# Patient Record
Sex: FEMALE | Race: BLACK OR AFRICAN AMERICAN | NOT HISPANIC OR LATINO | URBAN - METROPOLITAN AREA
[De-identification: names, ages, dates, MRNs, and addresses within clinical notes are randomized per-mention and may not be internally consistent; named-entity substitution may affect disease eponyms.]

---

## 2022-01-31 ENCOUNTER — EMERGENCY (EMERGENCY)
Facility: HOSPITAL | Age: 37
LOS: 1 days | Discharge: HOME | End: 2022-01-31
Attending: EMERGENCY MEDICINE | Admitting: EMERGENCY MEDICINE
Payer: MEDICAID

## 2022-01-31 VITALS
RESPIRATION RATE: 17 BRPM | OXYGEN SATURATION: 99 % | SYSTOLIC BLOOD PRESSURE: 141 MMHG | DIASTOLIC BLOOD PRESSURE: 97 MMHG | TEMPERATURE: 98 F | HEART RATE: 74 BPM

## 2022-01-31 VITALS
OXYGEN SATURATION: 100 % | RESPIRATION RATE: 18 BRPM | TEMPERATURE: 98 F | SYSTOLIC BLOOD PRESSURE: 137 MMHG | DIASTOLIC BLOOD PRESSURE: 81 MMHG | HEART RATE: 63 BPM

## 2022-01-31 PROBLEM — Z00.00 ENCOUNTER FOR PREVENTIVE HEALTH EXAMINATION: Status: ACTIVE | Noted: 2022-01-31

## 2022-01-31 PROCEDURE — 99283 EMERGENCY DEPT VISIT LOW MDM: CPT | Mod: 25

## 2022-01-31 PROCEDURE — 99282 EMERGENCY DEPT VISIT SF MDM: CPT

## 2022-01-31 NOTE — ED PROVIDER NOTE - CLINICAL SUMMARY MEDICAL DECISION MAKING FREE TEXT BOX
O'Cynthia DO PGY-2: pt p/w b/l ear canal cerumen impaction. NO PAIN only decrease in hearing. No red flags of mastoid tenderness.

## 2022-01-31 NOTE — ED PROVIDER NOTE - CARE PROVIDER_API CALL
Gracie Steele)  Otolaryngology  [address] [place] [address], [address]  [place] [address]  Phone: [phone]  Fax: [phone]  Follow Up Time: 1-3 Days

## 2022-01-31 NOTE — ED PROVIDER NOTE - PATIENT PORTAL LINK FT
You can access the FollowMyHealth Patient Portal offered by [facility] by registering at the following website: [email or web address]. By joining [facility]’s FollowMyHealth portal, you will also be able to view your health information using other applications (apps) compatible with our system.

## 2022-01-31 NOTE — ED PROVIDER NOTE - OBJECTIVE STATEMENT
37 y/o F p/w b/l ear cerumen impaction. Pt denies ANY pain. Pt went to PCP earlier today who saw the impaction then gave the pt info to see an ENT doc. Pt did not want to wait and came to the ED. Pt denies hx of recurrent ear infxn. Pt denies any ear pain. Pt only c/o decrease in hearing in b/l ears.

## 2022-01-31 NOTE — ED PROVIDER NOTE - PHYSICAL EXAMINATION
Stable CONSTITUTIONAL: Well-developed; well-nourished; in no acute distress.   SKIN: warm, dry  HEAD: Normocephalic; atraumatic.  EYES: no conjunctival injection. PERRL.   ENT: No nasal discharge; airway clear. +b/l cerumen impaction. Unable to visualise the TM's. No mastoid tenderness   NECK: Supple; non tender.  CARD: S1, S2 normal; no murmurs, gallops, or rubs. Regular rate and rhythm.   RESP: No wheezes, rales or rhonchi. Good air movement bilaterally.   ABD: soft ntnd, no guarding, no distention, no rigidity.   EXT: Ambulates independently.  No cyanosis or edema.   NEURO: Alert, oriented, grossly unremarkable  PSYCH: Cooperative, appropriate.

## 2022-01-31 NOTE — ED ADULT NURSE NOTE - OBJECTIVE STATEMENT
36 y F presents to the ED with b/l ear pain since 1/26. Pt went to see pcp who told her she had cecum impaction. Pt denies pain. reports that she has decreased hearing on the L. On assessment, A&Ox4. Breathing spontaneously and unlabored on room air. Pt safety maintained. Call bell within reach. Side rails in upward position. Seen and evaluated by MD. Awaiting dispo.

## 2022-01-31 NOTE — ED PROVIDER NOTE - NSFOLLOWUPCLINICS_GEN_ALL_ED_FT
[facility] - ENT  Otolaryngology (ENT)  [address] [place] [address]  Phone: [phone]  Fax:   Follow Up Time: 1-3 Days    [facility] Internal Medicine  General Internal Medicine  [address] [place] [id number]  Phone: [phone]  Fax:   Follow Up Time: 1-3 Days

## 2022-01-31 NOTE — ED PROVIDER NOTE - NSFOLLOWUPINSTRUCTIONS_ED_ALL_ED_FT
(1) Listed below are the specialists that will be necessary to see as an outpatient to continue the workup.  Please call the numbers listed below or [phone] to set up the necessary appointments.  (2) Immediately seek care at your nearest emergency room if your symptoms worsen, persist, or do not resolve   (3) Continue to use Over-The-Counter Debrox    ---------------------------------------------------------------------    Earwax Buildup, Adult      The ears produce a substance called earwax that helps keep bacteria out of the ear and protects the skin in the ear canal. Occasionally, earwax can build up in the ear and cause discomfort or hearing loss.      What are the causes?    This condition is caused by a buildup of earwax. Ear canals are self-cleaning. Ear wax is made in the outer part of the ear canal and generally falls out in small amounts over time.    When the self-cleaning mechanism is not working, earwax builds up and can cause decreased hearing and discomfort. Attempting to clean ears with cotton swabs can push the earwax deep into the ear canal and cause decreased hearing and pain.    What increases the risk?    This condition is more likely to develop in people who:  •Clean their ears often with cotton swabs.  •Pick at their ears.  •Use earplugs or in-ear headphones often, or wear hearing aids.      The following factors may also make you more likely to develop this condition:  •Being male.  •Being of older age.  •Naturally producing more earwax.  •Having narrow ear canals.  •Having earwax that is overly thick or sticky.  •Having excess hair in the ear canal.  •Having eczema.  •Being dehydrated.    What are the signs or symptoms?    Symptoms of this condition include:  •Reduced or muffled hearing.  •A feeling of fullness in the ear or feeling that the ear is plugged.  •Fluid coming from the ear.  •Ear pain or an itchy ear.  •Ringing in the ear.  •Coughing.  •Balance problems.  •An obvious piece of earwax that can be seen inside the ear canal.    How is this diagnosed?    This condition may be diagnosed based on:  •Your symptoms.  •Your medical history.  •An ear exam. During the exam, your health care provider will look into your ear with an instrument called an otoscope.    You may have tests, including a hearing test.    How is this treated?    This condition may be treated by:  •Using ear drops to soften the earwax.  •Having the earwax removed by a health care provider. The health care provider may:  •Flush the ear with water.  •Use an instrument that has a loop on the end (curette).  •Having surgery to remove the wax buildup. This may be done in severe cases.    Contact a health care provider if:  •You have ear pain.  •You develop a fever.  •You have pus or other fluid coming from your ear.  •You have hearing loss.  •You have ringing in your ears that does not go away.  •You feel like the room is spinning (vertigo).  •Your symptoms do not improve with treatment.    Get help right away if:  •You have bleeding from the affected ear.  •You have severe ear pain.    Summary  •Earwax can build up in the ear and cause discomfort or hearing loss.  •The most common symptoms of this condition include reduced or muffled hearing, a feeling of fullness in the ear, or feeling that the ear is plugged.  •This condition may be diagnosed based on your symptoms, your medical history, and an ear exam.  •This condition may be treated by using ear drops to soften the earwax or by having the earwax removed by a health care provider.  • Do not put any objects, including cotton swabs, into your ear. You can clean the opening of your ear canal with a washcloth or facial tissue. While you were here, your blood pressure was elevated.  Please follow up with your primary care doctor for follow up in regards to this matter in 1-3 days.    (1) Listed below are the specialists that will be necessary to see as an outpatient to continue the workup.  Please call the numbers listed below or [phone] to set up the necessary appointments.  (2) Immediately seek care at your nearest emergency room if your symptoms worsen, persist, or do not resolve   (3) Continue to use Over-The-Counter Debrox    ---------------------------------------------------------------------    Earwax Buildup, Adult      The ears produce a substance called earwax that helps keep bacteria out of the ear and protects the skin in the ear canal. Occasionally, earwax can build up in the ear and cause discomfort or hearing loss.      What are the causes?    This condition is caused by a buildup of earwax. Ear canals are self-cleaning. Ear wax is made in the outer part of the ear canal and generally falls out in small amounts over time.    When the self-cleaning mechanism is not working, earwax builds up and can cause decreased hearing and discomfort. Attempting to clean ears with cotton swabs can push the earwax deep into the ear canal and cause decreased hearing and pain.    What increases the risk?    This condition is more likely to develop in people who:  •Clean their ears often with cotton swabs.  •Pick at their ears.  •Use earplugs or in-ear headphones often, or wear hearing aids.      The following factors may also make you more likely to develop this condition:  •Being male.  •Being of older age.  •Naturally producing more earwax.  •Having narrow ear canals.  •Having earwax that is overly thick or sticky.  •Having excess hair in the ear canal.  •Having eczema.  •Being dehydrated.    What are the signs or symptoms?    Symptoms of this condition include:  •Reduced or muffled hearing.  •A feeling of fullness in the ear or feeling that the ear is plugged.  •Fluid coming from the ear.  •Ear pain or an itchy ear.  •Ringing in the ear.  •Coughing.  •Balance problems.  •An obvious piece of earwax that can be seen inside the ear canal.    How is this diagnosed?    This condition may be diagnosed based on:  •Your symptoms.  •Your medical history.  •An ear exam. During the exam, your health care provider will look into your ear with an instrument called an otoscope.    You may have tests, including a hearing test.    How is this treated?    This condition may be treated by:  •Using ear drops to soften the earwax.  •Having the earwax removed by a health care provider. The health care provider may:  •Flush the ear with water.  •Use an instrument that has a loop on the end (curette).  •Having surgery to remove the wax buildup. This may be done in severe cases.    Contact a health care provider if:  •You have ear pain.  •You develop a fever.  •You have pus or other fluid coming from your ear.  •You have hearing loss.  •You have ringing in your ears that does not go away.  •You feel like the room is spinning (vertigo).  •Your symptoms do not improve with treatment.    Get help right away if:  •You have bleeding from the affected ear.  •You have severe ear pain.    Summary  •Earwax can build up in the ear and cause discomfort or hearing loss.  •The most common symptoms of this condition include reduced or muffled hearing, a feeling of fullness in the ear, or feeling that the ear is plugged.  •This condition may be diagnosed based on your symptoms, your medical history, and an ear exam.  •This condition may be treated by using ear drops to soften the earwax or by having the earwax removed by a health care provider.  • Do not put any objects, including cotton swabs, into your ear. You can clean the opening of your ear canal with a washcloth or facial tissue. While you were here, your blood pressure was elevated.  Please follow up with your primary care doctor for follow up in regards to this matter in 1-3 days.    (1) Listed below are the specialists that will be necessary to see as an outpatient to continue the workup.  Please call the numbers listed below or [phone] to set up the necessary appointments.  (2) Immediately seek care at your nearest emergency room if your symptoms worsen, persist, or do not resolve   (3) Continue to use Over-The-Counter Debrox    ---------------------------------------------------------------------    Earwax Blockage    AMBULATORY CARE:    Earwax can build up in your ear canal and cause a blockage. Earwax blockage happens when your ear makes earwax faster than your body can remove it.     Common symptoms include the following:   •Trouble hearing  •Earache  •Ear fullness or a feeling that something is plugging up your ear  •Itching or ringing in your ear  •Dizziness    Seed immediate care if:   •You feel dizzy  •You have discharge or blood coming out of your ear.  •Your ear pain does not go away or gets worse.    Call your doctor if:   •You have a fever.   •You have trouble hearing or hear ringing noises.  •You have questions or concerns about your condition or care.    Treatment for earwax blockage:   •Medicines placed in the ear canal can soften the earwax so it will come out.  •Flushing your ear canal with warm water may flush out the earwax.  •Small medical tools may be used to remove the earwax.    ---------------------------    Earwax Buildup, Adult      The ears produce a substance called earwax that helps keep bacteria out of the ear and protects the skin in the ear canal. Occasionally, earwax can build up in the ear and cause discomfort or hearing loss.      What are the causes?    This condition is caused by a buildup of earwax. Ear canals are self-cleaning. Ear wax is made in the outer part of the ear canal and generally falls out in small amounts over time.    When the self-cleaning mechanism is not working, earwax builds up and can cause decreased hearing and discomfort. Attempting to clean ears with cotton swabs can push the earwax deep into the ear canal and cause decreased hearing and pain.    What increases the risk?    This condition is more likely to develop in people who:  •Clean their ears often with cotton swabs.  •Pick at their ears.  •Use earplugs or in-ear headphones often, or wear hearing aids.      The following factors may also make you more likely to develop this condition:  •Being male.  •Being of older age.  •Naturally producing more earwax.  •Having narrow ear canals.  •Having earwax that is overly thick or sticky.  •Having excess hair in the ear canal.  •Having eczema.  •Being dehydrated.    What are the signs or symptoms?    Symptoms of this condition include:  •Reduced or muffled hearing.  •A feeling of fullness in the ear or feeling that the ear is plugged.  •Fluid coming from the ear.  •Ear pain or an itchy ear.  •Ringing in the ear.  •Coughing.  •Balance problems.  •An obvious piece of earwax that can be seen inside the ear canal.    How is this diagnosed?    This condition may be diagnosed based on:  •Your symptoms.  •Your medical history.  •An ear exam. During the exam, your health care provider will look into your ear with an instrument called an otoscope.    You may have tests, including a hearing test.    How is this treated?    This condition may be treated by:  •Using ear drops to soften the earwax.  •Having the earwax removed by a health care provider. The health care provider may:  •Flush the ear with water.  •Use an instrument that has a loop on the end (curette).  •Having surgery to remove the wax buildup. This may be done in severe cases.    Contact a health care provider if:  •You have ear pain.  •You develop a fever.  •You have pus or other fluid coming from your ear.  •You have hearing loss.  •You have ringing in your ears that does not go away.  •You feel like the room is spinning (vertigo).  •Your symptoms do not improve with treatment.    Get help right away if:  •You have bleeding from the affected ear.  •You have severe ear pain.    Summary  •Earwax can build up in the ear and cause discomfort or hearing loss.  •The most common symptoms of this condition include reduced or muffled hearing, a feeling of fullness in the ear, or feeling that the ear is plugged.  •This condition may be diagnosed based on your symptoms, your medical history, and an ear exam.  •This condition may be treated by using ear drops to soften the earwax or by having the earwax removed by a health care provider.  • Do not put any objects, including cotton swabs, into your ear. You can clean the opening of your ear canal with a washcloth or facial tissue.

## 2022-01-31 NOTE — ED ADULT NURSE NOTE - CAS EDN DISCHARGE INTERVENTIONS
Otolaryngologist Procedure Text (A): After obtaining clear surgical margins the patient was sent to otolaryngology for surgical repair.  The patient understands they will receive post-surgical care and follow-up from the referring physician's office. n/a

## 2022-01-31 NOTE — ED PROVIDER NOTE - NS ED ROS FT
CONSTITUTIONAL - No fever, No diaphoresis, No weight change  SKIN - No rash  HEMATOLOGIC - No abnormal bleeding or bruising  EYES +decreased hearing in b/l ears   ENT - No change in hearing, No sore throat, No neck pain, No rhinorrhea, No ear pain  RESPIRATORY - No shortness of breath, No cough  CARDIAC -No chest pain, No palpitations  GI - No abdominal pain, No nausea, No vomiting, No diarrhea, No constipation  - No dysuria, no frequency, no hematuria.   MUSCULOSKELETAL - No joint pain, No swelling, No back pain  NEUROLOGIC - No numbness, No focal weakness, No headache, No dizziness

## 2022-01-31 NOTE — ED PROVIDER NOTE - ATTENDING CONTRIBUTION TO CARE
Attending MD Toro: I personally have seen and examined this patient.  Resident note reviewed and agree on plan of care and except where noted.  See below for details.     seen in Blue 35R    36R with no reported PMH/PSH/Meds, no known drug allergies presents to the ED with bilateral ear cerumen impaction.  Reports that she has been having bilateral cerumen impaction.  Reports that she has been trying to use OTC wax dissolver but today went to see PMD who saw cerumen impaction and sent patient to ENT.  Reports that she has tried multiple ENTs but no one accepts her insurance.  Reports decreased hearing bilateral ears.  Denies pain at this time.  Denies change in vision, double vision, sudden loss of vision. Denies neck pain, headache.  Denies fevers, chills.      Exam:   General: NAD  HENT: head NCAT, airway patent, bilateral TMs unable to be visualized, cerumen impaction visible without otoscope bilaterally, maceration of skin of canal, no erythema, no conjunctival injection   Chest: symmetric chest rise, no increased work of breathing  MSK: ranging neck freely  Neuro: moving all extremities spontaneously, sensory grossly intact, no gross neuro deficits  Psych: normal mood and affect     A/P: 36F with bilateral cerumen impaction, removed only superficially visible cerumen bilaterally to avoid trauma to canal, referred patient to ENT clinic.  Stable for discharge. Follow up instructions given, importance of follow up emphasized, return to ED parameters reviewed and patient verbalized understanding.  All questions answered, all concerns addressed.

## 2022-02-03 ENCOUNTER — APPOINTMENT (OUTPATIENT)
Dept: OTOLARYNGOLOGY | Facility: CLINIC | Age: 37
End: 2022-02-03
Payer: MEDICAID

## 2022-02-03 VITALS
TEMPERATURE: 97.9 F | OXYGEN SATURATION: 98 % | DIASTOLIC BLOOD PRESSURE: 90 MMHG | BODY MASS INDEX: 28.35 KG/M2 | HEART RATE: 90 BPM | WEIGHT: 160 LBS | HEIGHT: 63 IN | SYSTOLIC BLOOD PRESSURE: 140 MMHG

## 2022-02-03 DIAGNOSIS — H93.292 OTHER ABNORMAL AUDITORY PERCEPTIONS, LEFT EAR: ICD-10-CM

## 2022-02-03 PROCEDURE — 99204 OFFICE O/P NEW MOD 45 MIN: CPT | Mod: 25

## 2022-02-03 NOTE — PROCEDURE
[FreeTextEntry3] : After informed verbal consent is obtained, cerumen is removed from the b/l ear canal with a  suction. Pt tolerated well, no residual ear canal irritation. \par

## 2022-02-03 NOTE — HISTORY OF PRESENT ILLNESS
[de-identified] : Ms. ARAYA is a 36 year female with a h/o cerumen impaction \par uses debrox prn \par last week noted a lot of wax coming out of the ears \par + ear fullness and muffled hearing on the left side\par seen by PCP noted to have erythema on the right and cerumen impaction on the left\par no otalgia, no otorrhea, no tinnitus \par seen in ED and they attempted to clean it out with limited success \par

## 2022-02-03 NOTE — PHYSICAL EXAM
[Midline] : trachea located in midline position [Normal] : no rashes [de-identified] : cerumen impaction au

## 2022-02-03 NOTE — ASSESSMENT
[FreeTextEntry1] : ear fullness: \par - cerumen removal performed today \par - debrox 1night per week to keep ears clear of wax\par - f/up in 6 months\par \par unsure if hearing loss has resolved with cleaning\par - will obtain audio and call with results

## 2022-02-09 ENCOUNTER — APPOINTMENT (OUTPATIENT)
Dept: OTOLARYNGOLOGY | Facility: CLINIC | Age: 37
End: 2022-02-09
Payer: MEDICAID

## 2022-02-09 PROCEDURE — 92557 COMPREHENSIVE HEARING TEST: CPT

## 2022-02-09 PROCEDURE — 92567 TYMPANOMETRY: CPT

## 2022-07-28 ENCOUNTER — APPOINTMENT (OUTPATIENT)
Dept: OTOLARYNGOLOGY | Facility: CLINIC | Age: 37
End: 2022-07-28

## 2022-07-28 VITALS
RESPIRATION RATE: 14 BRPM | SYSTOLIC BLOOD PRESSURE: 118 MMHG | HEIGHT: 63 IN | OXYGEN SATURATION: 97 % | WEIGHT: 160 LBS | TEMPERATURE: 98.3 F | BODY MASS INDEX: 28.35 KG/M2 | DIASTOLIC BLOOD PRESSURE: 73 MMHG | HEART RATE: 78 BPM

## 2022-07-28 DIAGNOSIS — Z78.9 OTHER SPECIFIED HEALTH STATUS: ICD-10-CM

## 2022-07-28 DIAGNOSIS — F17.210 NICOTINE DEPENDENCE, CIGARETTES, UNCOMPLICATED: ICD-10-CM

## 2022-07-28 DIAGNOSIS — H61.21 IMPACTED CERUMEN, RIGHT EAR: ICD-10-CM

## 2022-07-28 PROCEDURE — 99213 OFFICE O/P EST LOW 20 MIN: CPT | Mod: 25

## 2022-07-28 RX ORDER — CYCLOBENZAPRINE HYDROCHLORIDE 10 MG/1
10 TABLET, FILM COATED ORAL
Qty: 14 | Refills: 0 | Status: COMPLETED | COMMUNITY
Start: 2022-06-20

## 2022-07-28 RX ORDER — NAPROXEN 500 MG/1
500 TABLET ORAL
Qty: 28 | Refills: 0 | Status: COMPLETED | COMMUNITY
Start: 2022-06-20

## 2022-07-28 NOTE — HISTORY OF PRESENT ILLNESS
[de-identified] : 37 year old female presents for follow-up for clogged ears. \par  [FreeTextEntry1] : ear fullness

## 2022-07-28 NOTE — END OF VISIT
[FreeTextEntry3] : I personally saw and examined REYMUNDO ARAYA in detail.  I spoke to HINA Ramirez regarding the assessment and plan of care. I performed the procedures and relevant physical exam.  I have reviewed the above assessment and plan of care and I agree.  I have made changes to the body of the note wherever necessary and appropriate.\par

## 2022-07-28 NOTE — ASSESSMENT
[FreeTextEntry1] : Ms. ARAYA is a 37 year female with ear fullness, right sided crumen found on exam and removed\par  - last audio wnl\par - f/u 6m - annually for ear check\par - will plan to recheck audio in a few years unless anything changes

## 2022-07-28 NOTE — PROCEDURE
[FreeTextEntry2] : cerumen impaction\par  [FreeTextEntry3] : After informed verbal consent is obtained, cerumen is removed from the right ear canal with a curette Pt tolerated well, no residual ear canal irritation. \par \par

## 2023-02-02 ENCOUNTER — APPOINTMENT (OUTPATIENT)
Dept: OTOLARYNGOLOGY | Facility: CLINIC | Age: 38
End: 2023-02-02
Payer: MEDICAID

## 2023-02-02 VITALS
DIASTOLIC BLOOD PRESSURE: 86 MMHG | HEIGHT: 63 IN | SYSTOLIC BLOOD PRESSURE: 122 MMHG | WEIGHT: 160 LBS | HEART RATE: 82 BPM | OXYGEN SATURATION: 99 % | BODY MASS INDEX: 28.35 KG/M2

## 2023-02-02 DIAGNOSIS — H61.23 IMPACTED CERUMEN, BILATERAL: ICD-10-CM

## 2023-02-02 DIAGNOSIS — H93.8X3 OTHER SPECIFIED DISORDERS OF EAR, BILATERAL: ICD-10-CM

## 2023-02-02 PROCEDURE — 99213 OFFICE O/P EST LOW 20 MIN: CPT | Mod: 25

## 2023-02-02 NOTE — HISTORY OF PRESENT ILLNESS
[de-identified] : 37 year old female presents for follow-up for clogged ears. \par  [FreeTextEntry1] : ear fullness bilateral

## 2023-02-02 NOTE — ASSESSMENT
[FreeTextEntry1] : Ms. ARAYA is a 37 year female with ear fullness, bilateral cerumen found on exam and removed\par \par  - last audio wnl\par - f/u 1 year - annually for ear check\par - will plan to recheck audio in a few years unless anything changes ([id number])

## 2023-02-02 NOTE — PROCEDURE
[FreeTextEntry2] : cerumen impaction\par  [FreeTextEntry3] : After informed verbal consent is obtained, cerumen is removed from the bilateral ear canals with a curette Pt tolerated well, no residual ear canal irritation. \par \par

## 2024-03-21 ENCOUNTER — APPOINTMENT (OUTPATIENT)
Dept: OTOLARYNGOLOGY | Facility: CLINIC | Age: 39
End: 2024-03-21
Payer: MEDICAID

## 2024-03-21 VITALS
HEIGHT: 63 IN | HEART RATE: 78 BPM | WEIGHT: 160 LBS | SYSTOLIC BLOOD PRESSURE: 134 MMHG | DIASTOLIC BLOOD PRESSURE: 86 MMHG | OXYGEN SATURATION: 98 % | BODY MASS INDEX: 28.35 KG/M2

## 2024-03-21 DIAGNOSIS — H93.8X9 OTHER SPECIFIED DISORDERS OF EAR, UNSPECIFIED EAR: ICD-10-CM

## 2024-03-21 PROCEDURE — 99213 OFFICE O/P EST LOW 20 MIN: CPT

## 2024-03-21 NOTE — HISTORY OF PRESENT ILLNESS
[de-identified] : prior cerumen impaction in 2022, pt comes for annual ear check  [FreeTextEntry1] : pt here for f/u she denies changes to her hearing and no ear clogging, she will occasionally use Debrox. does not want audio today

## 2024-03-21 NOTE — ASSESSMENT
[FreeTextEntry1] : Ms. ARAYA is a 38 year female here for ear check. Ear exam is unremarkable today   - last audio wnl 2022 - f/u 1 year - annually for ear check - will plan to recheck audio as needed

## 2025-03-25 ENCOUNTER — APPOINTMENT (OUTPATIENT)
Dept: OTOLARYNGOLOGY | Facility: CLINIC | Age: 40
End: 2025-03-25
Payer: MEDICAID

## 2025-03-25 VITALS
SYSTOLIC BLOOD PRESSURE: 123 MMHG | DIASTOLIC BLOOD PRESSURE: 85 MMHG | WEIGHT: 160 LBS | BODY MASS INDEX: 28.34 KG/M2 | TEMPERATURE: 98 F | HEART RATE: 74 BPM

## 2025-03-25 DIAGNOSIS — H61.21 IMPACTED CERUMEN, RIGHT EAR: ICD-10-CM

## 2025-03-25 DIAGNOSIS — H93.8X9 OTHER SPECIFIED DISORDERS OF EAR, UNSPECIFIED EAR: ICD-10-CM

## 2025-03-25 PROCEDURE — 99213 OFFICE O/P EST LOW 20 MIN: CPT | Mod: 25
